# Patient Record
Sex: FEMALE | NOT HISPANIC OR LATINO
[De-identification: names, ages, dates, MRNs, and addresses within clinical notes are randomized per-mention and may not be internally consistent; named-entity substitution may affect disease eponyms.]

---

## 2017-02-26 ENCOUNTER — RESULT REVIEW (OUTPATIENT)
Age: 77
End: 2017-02-26

## 2017-02-27 ENCOUNTER — APPOINTMENT (OUTPATIENT)
Dept: GASTROENTEROLOGY | Facility: CLINIC | Age: 77
End: 2017-02-27

## 2017-03-17 ENCOUNTER — APPOINTMENT (OUTPATIENT)
Dept: GASTROENTEROLOGY | Facility: CLINIC | Age: 77
End: 2017-03-17

## 2017-03-21 ENCOUNTER — APPOINTMENT (OUTPATIENT)
Dept: GASTROENTEROLOGY | Facility: CLINIC | Age: 77
End: 2017-03-21

## 2018-02-26 ENCOUNTER — RX RENEWAL (OUTPATIENT)
Age: 78
End: 2018-02-26

## 2018-02-26 DIAGNOSIS — K20.9 ESOPHAGITIS, UNSPECIFIED: ICD-10-CM

## 2018-02-26 RX ORDER — ESOMEPRAZOLE MAGNESIUM 40 MG/1
40 CAPSULE, DELAYED RELEASE ORAL
Qty: 90 | Refills: 0 | Status: ACTIVE | COMMUNITY
Start: 2018-02-26 | End: 1900-01-01

## 2018-07-05 ENCOUNTER — RESULT REVIEW (OUTPATIENT)
Age: 78
End: 2018-07-05

## 2022-11-03 ENCOUNTER — APPOINTMENT (OUTPATIENT)
Dept: ORTHOPEDIC SURGERY | Facility: CLINIC | Age: 82
End: 2022-11-03

## 2022-11-03 DIAGNOSIS — M54.6 PAIN IN THORACIC SPINE: ICD-10-CM

## 2022-11-03 PROCEDURE — 99204 OFFICE O/P NEW MOD 45 MIN: CPT

## 2022-11-03 RX ORDER — MELOXICAM 7.5 MG/1
7.5 TABLET ORAL TWICE DAILY
Qty: 60 | Refills: 0 | Status: ACTIVE | COMMUNITY
Start: 2022-11-03 | End: 1900-01-01

## 2022-11-07 ENCOUNTER — APPOINTMENT (OUTPATIENT)
Dept: MRI IMAGING | Facility: CLINIC | Age: 82
End: 2022-11-07

## 2022-11-07 PROCEDURE — 72146 MRI CHEST SPINE W/O DYE: CPT

## 2022-11-07 PROCEDURE — 72148 MRI LUMBAR SPINE W/O DYE: CPT

## 2022-11-08 RX ORDER — MELOXICAM 7.5 MG/1
7.5 TABLET ORAL
Qty: 60 | Refills: 0 | Status: ACTIVE | COMMUNITY
Start: 2022-11-08 | End: 1900-01-01

## 2022-11-09 RX ORDER — MELOXICAM 15 MG/1
15 TABLET ORAL
Qty: 30 | Refills: 2 | Status: ACTIVE | COMMUNITY
Start: 2022-11-09 | End: 1900-01-01

## 2022-11-10 ENCOUNTER — APPOINTMENT (OUTPATIENT)
Dept: ORTHOPEDIC SURGERY | Facility: CLINIC | Age: 82
End: 2022-11-10

## 2022-11-10 VITALS — BODY MASS INDEX: 20.49 KG/M2 | WEIGHT: 120 LBS | HEIGHT: 64 IN

## 2022-11-10 PROCEDURE — 99214 OFFICE O/P EST MOD 30 MIN: CPT

## 2022-11-10 RX ORDER — CELECOXIB 100 MG/1
100 CAPSULE ORAL
Qty: 60 | Refills: 0 | Status: ACTIVE | COMMUNITY
Start: 2022-11-10 | End: 1900-01-01

## 2022-11-10 NOTE — ADDENDUM
[FreeTextEntry1] : This note was written by Tammy Mitchell on 11/10/2022 acting as scribe for Dr. Ang Samson M.D.  \par \par I, Ang Samson MD, have read and attest that all the information, medical decision making and discharge instructions within are true and accurate.\par

## 2022-11-10 NOTE — PHYSICAL EXAM
[Stooped] : stooped [Coats's Sign] : negative Coats's sign [Pronator Drift] : negative pronator drift [SLR] : negative straight leg raise [de-identified] : 5 out of 5 motor strength, sensation is intact and symmetrical full range of motion flexion extension and rotation, no palpatory tenderness full range of motion of hips knees shoulders and elbows (all four extremities), no atrophy, negative straight leg raise, no pathological reflexes, no swelling, normal ambulation, no apparent distress skin is intact, no palpable lymph nodes, no upper or lower extremity instability, alert and oriented x3 and normal mood. Normal finger-to nose test. \par Mild percussion tenderness lower thoracic upper lumbar. [de-identified] : I reviewed, interpreted and clinically correlated the following outside imaging studies,\par \par EXAM: [id number] - MR SPINE LUMBAR  - ORDERED BY: MANDY WATKINS\par \par EXAM: [id number] - MR SPINE THORACIC  - ORDERED BY: MANDY WATKINS\par \par \par PROCEDURE DATE:  11/07/2022\par \par \par \par INTERPRETATION:  CLINICAL INDICATION: Thoracic and lumbar spine pain. Concern for compression fracture. Low back muscle strain.\par \par Multiplanar Multisequence MR of the thoracic and lumbar spine.\par \par Prior Studies: None.\par \par FINDINGS:\par \par THORACIC SPINE:\par \par ALIGNMENT: There is slight levoscoliosis of the thoracic spine. The thoracic kyphosis is maintained. There is no spondylolisthesis.\par \par VERTEBRAL BODIES AND MARROW: There is an acute mild compression fracture of the T12 vertebral body with the fracture line subjacent to the superior endplate. There is mild to moderate loss of height. There is no osseous retropulsion.\par \par DISC SPACES: There is multilevel mild disc height loss, most prominent at T5/T6 and T6/T7.\par \par THORACIC CORD: Thoracic cord is normal in caliber and signal characteristics.\par \par IMAGED THORACIC SOFT TISSUES: Intact.\par \par The findings at the individual levels are as follows:\par \par There is no spinal canal or neural foraminal narrowing. There is a minimal disc bulge with slight posterior osseous ridging at T11/T12.\par \par LUMBAR SPINE:\par \par ALIGNMENT: There is slight rightward curvature of the lower lumbar spine. There is trace left lateral listhesis of L2 on L3. Lumbar lordosis is maintained. There is mild degenerative grade 1 anterolisthesis of L4 on L5.\par \par VERTEBRAL BODIES AND MARROW: There is a large chronic Schmorl's node along the inferior endplate of L2. Mild edematous end plate changes are seen at L2/L3 and L3/L4.\par \par DISC SPACES: There is multilevel disc desiccation. There is mild disc height loss at L2/L3.\par \par SACROILIAC JOINTS: There is mild bilateral sacroiliac joint arthrosis.\par \par CONUS AND CAUDA EQUINA: Conus is normal in morphology terminating at the level of L1.\par \par IMAGED ABDOMINAL AND PELVIC STRUCTURES: There is a left-sided renal cyst measuring up to 1.7 cm. There is colonic diverticulosis.\par \par The findings at the individual levels are as follows:\par \par L1/L2: There is minimal disc bulge with posterior osseous ridging. There is bilateral facet arthrosis. There is mild bilateral neural foraminal narrowing. There is no central canal narrowing.\par \par L2/L3: There is mild diffuse disc bulge with a superimposed right foraminal disc protrusion. There is bilateral facet arthrosis and ligamentum flavum hypertrophy. Findings result in mild spinal canal narrowing and mild bilateral neural foraminal narrowing, greater on the right.\par \par L3/4: There is mild diffuse disc bulge posterior osseous ridging. There is bilateral facet arthrosis and ligamentum flavum hypertrophy. There is mild bilateral neural foraminal narrowing. There is mild to moderate spinal canal narrowing.\par \par L4/5: There is uncovering of the posterior disc with a mild diffuse disc bulge. There is bilateral facet arthrosis and ligamentum flavum hypertrophy. Findings result in mild spinal canal narrowing and bilateral neural foraminal narrowing, mild to moderate on the right and mild on the left.\par \par L5/S1: There is bilateral facet arthrosis. There is a small right foraminal disc protrusion. There is mild right neural foraminal narrowing with mild contacting along the undersurface of the exiting right L5 nerve root. There is no central canal or left-sided neural foraminal narrowing.\par \par IMPRESSION:\par \par THORACIC SPINE:\par \par Mild acute compression fracture of the T12 vertebral body along the superior endplate. No significant osseous retropulsion. No spinal canal or neural foraminal narrowing. Follow-up imaging is recommended to confirm reconstitution of fatty marrow signal and exclude any underlying pathologic etiology.\par \par LUMBAR SPINE:\par \par Multilevel lumbar spondylosis. Slight rightward curvature lower lumbar spine. Trace left lateral listhesis of L2 on L3. Mild degenerative grade 1 anterolisthesis of L4 on L5.\par \par L1/L2: There is mild bilateral neural foraminal narrowing.\par \par L2/L3: There is mild spinal canal narrowing and mild bilateral neural foraminal narrowing, greater on the right.\par \par L3/4: There is mild bilateral neural foraminal narrowing. There is mild to moderate spinal canal narrowing.\par \par L4/5: There is mild spinal canal narrowing and bilateral neural foraminal narrowing, mild to moderate on the right and mild on the left.\par \par L5/S1: There is mild right neural foraminal narrowing with mild contacting along the undersurface of the exiting right L5 nerve root.\par \par S1/S2: There is no spinal canal or neural foraminal narrowing. 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 Nov 7 2022  1:13PM\par \par  \par \par XR 2V Right Hip 10/28/2022 - Status post hemiarthroplasty with no periprosthetic fracture- reviewed with patient and friend.	\par \par XR AP LAT Lumbar 10/28/2022 - No acute fracture. Grade 1 spondylolisthesis of L4 on L5. Moderate degenerative spondylosis at L2-L3. Old compression fracture L2. - reviewed with patient and friend.		\par \par XR AP Lat Thoracic 10/28/2022 - No acute fracture. Old mild compression fractures T4-T8.- reviewed with patient and friend. 		\par \par 	\par

## 2022-11-10 NOTE — DISCUSSION/SUMMARY
[de-identified] : T12 compression fracture, 2 weeks old. \par All options discussed. \par Continue brace wear as directed for the next 3-4 weeks. \par Celebrex as needed. \par F/u 4 weeks with new xrays.\par If no better, kyphoplasty. \par All options discussed including rest, medicine, home exercise, acupuncture, Chiropractic care, Physical Therapy, Pain management, and last resort surgery. All questions were answered, all alternatives discussed and the patient is in complete agreement with that plan. Follow-up appointment as instructed. Any issues and the patient will call or come in sooner. \par Her friend agrees with the plan.

## 2022-11-10 NOTE — HISTORY OF PRESENT ILLNESS
[Stable] : stable [de-identified] : Ms. SHAUNA TOLEDO  is a 82 year old female who presents to the office for a follow-up visit. \par Here to review MRI results.\par She states that she is unable to wear the back brace. \par She is taking NSAIDs for pain as needed. \par Pt has taken old Mobic for this. \par Below from prior note on 11/3/22\par 82 year female presents for a follow up evaluation of lower back pain since lifting a window on 10/26/22. \par She had went to [facility] ED and had thoracic and lumbar XRs obtained which revealed old mild compression fractures of T4 through T8, spondylolisthesis of L4/5, moderate spondylosis at L2/3 and old compression fracture of L2. \par Since the injury, the pain has worsened.\par She has left sided thoracic and lumbar pain. \par Denies radiation of pain down the legs.\par Has numbness of the right foot since the injury. \par Uses tincture of arnica (homeopathic remedy) which alleviates the pain. \par No recent PT or chiropractic care. \par No fever chills sweats nausea vomiting no bowel or bladder dysfunction, no recent weight loss or gain no night pain. This history is in addition to the intake form that I personally reviewed.

## 2022-12-08 ENCOUNTER — APPOINTMENT (OUTPATIENT)
Dept: ORTHOPEDIC SURGERY | Facility: CLINIC | Age: 82
End: 2022-12-08

## 2022-12-08 VITALS
BODY MASS INDEX: 20.49 KG/M2 | OXYGEN SATURATION: 96 % | HEIGHT: 64 IN | DIASTOLIC BLOOD PRESSURE: 81 MMHG | HEART RATE: 77 BPM | WEIGHT: 120 LBS | TEMPERATURE: 97.4 F | SYSTOLIC BLOOD PRESSURE: 132 MMHG

## 2022-12-08 PROCEDURE — 72100 X-RAY EXAM L-S SPINE 2/3 VWS: CPT

## 2022-12-08 PROCEDURE — 99214 OFFICE O/P EST MOD 30 MIN: CPT

## 2023-01-09 ENCOUNTER — APPOINTMENT (OUTPATIENT)
Dept: ORTHOPEDIC SURGERY | Facility: CLINIC | Age: 83
End: 2023-01-09
Payer: MEDICARE

## 2023-01-09 VITALS
HEIGHT: 64 IN | DIASTOLIC BLOOD PRESSURE: 65 MMHG | WEIGHT: 115 LBS | TEMPERATURE: 98 F | SYSTOLIC BLOOD PRESSURE: 119 MMHG | OXYGEN SATURATION: 98 % | HEART RATE: 69 BPM | BODY MASS INDEX: 19.63 KG/M2

## 2023-01-09 PROCEDURE — 99214 OFFICE O/P EST MOD 30 MIN: CPT

## 2023-01-09 PROCEDURE — 72100 X-RAY EXAM L-S SPINE 2/3 VWS: CPT

## 2023-02-23 ENCOUNTER — APPOINTMENT (OUTPATIENT)
Dept: ORTHOPEDIC SURGERY | Facility: CLINIC | Age: 83
End: 2023-02-23
Payer: MEDICARE

## 2023-02-23 VITALS
SYSTOLIC BLOOD PRESSURE: 157 MMHG | DIASTOLIC BLOOD PRESSURE: 60 MMHG | HEART RATE: 67 BPM | HEIGHT: 64 IN | BODY MASS INDEX: 19.63 KG/M2 | WEIGHT: 115 LBS

## 2023-02-23 PROCEDURE — 99214 OFFICE O/P EST MOD 30 MIN: CPT

## 2023-02-23 PROCEDURE — 72100 X-RAY EXAM L-S SPINE 2/3 VWS: CPT

## 2023-02-23 NOTE — PHYSICAL EXAM
[Normal] : Gait: normal [Stooped] : stooped [Coats's Sign] : negative Coats's sign [Pronator Drift] : negative pronator drift [SLR] : negative straight leg raise [de-identified] : 5 out of 5 motor strength, sensation is intact and symmetrical full range of motion flexion extension and rotation, no palpatory tenderness full range of motion of hips knees shoulders and elbows (all four extremities), no atrophy, negative straight leg raise, no pathological reflexes, no swelling, normal ambulation, no apparent distress skin is intact, no palpable lymph nodes, no upper or lower extremity instability, alert and oriented x3 and normal mood. Normal finger-to nose test. \par No percussion tenderness lower thoracic upper lumbar. [de-identified] : XR AP Lat Lumbar 02/23/2023 -T12/L2 compression fracture, healing- reviewed with patient.\par \par XR AP Lat Thoracic 02/23/2023 -T12 compression fracture, healing- reviewed with patient.\par \par XR AP Lat Lumbar 01/09/2023 -T12 fracture -reviewed with patient. \par \par XR AP Lat Lumbar 12/08/2022-healing T12 fracture, old L2-reviewed with patient. \par \par \par I reviewed, interpreted and clinically correlated the following outside imaging studies,\par \par EXAM: [id number] - MR SPINE LUMBAR  - ORDERED BY: MANDY WATKINS\par \par EXAM: [id number] - MR SPINE THORACIC  - ORDERED BY: MANDY WATKINS\par \par \par PROCEDURE DATE:  11/07/2022\par \par \par \par INTERPRETATION:  CLINICAL INDICATION: Thoracic and lumbar spine pain. Concern for compression fracture. Low back muscle strain.\par \par Multiplanar Multisequence MR of the thoracic and lumbar spine.\par \par Prior Studies: None.\par \par FINDINGS:\par \par THORACIC SPINE:\par \par ALIGNMENT: There is slight levoscoliosis of the thoracic spine. The thoracic kyphosis is maintained. There is no spondylolisthesis.\par \par VERTEBRAL BODIES AND MARROW: There is an acute mild compression fracture of the T12 vertebral body with the fracture line subjacent to the superior endplate. There is mild to moderate loss of height. There is no osseous retropulsion.\par \par DISC SPACES: There is multilevel mild disc height loss, most prominent at T5/T6 and T6/T7.\par \par THORACIC CORD: Thoracic cord is normal in caliber and signal characteristics.\par \par IMAGED THORACIC SOFT TISSUES: Intact.\par \par The findings at the individual levels are as follows:\par \par There is no spinal canal or neural foraminal narrowing. There is a minimal disc bulge with slight posterior osseous ridging at T11/T12.\par \par LUMBAR SPINE:\par \par ALIGNMENT: There is slight rightward curvature of the lower lumbar spine. There is trace left lateral listhesis of L2 on L3. Lumbar lordosis is maintained. There is mild degenerative grade 1 anterolisthesis of L4 on L5.\par \par VERTEBRAL BODIES AND MARROW: There is a large chronic Schmorl's node along the inferior endplate of L2. Mild edematous end plate changes are seen at L2/L3 and L3/L4.\par \par DISC SPACES: There is multilevel disc desiccation. There is mild disc height loss at L2/L3.\par \par SACROILIAC JOINTS: There is mild bilateral sacroiliac joint arthrosis.\par \par CONUS AND CAUDA EQUINA: Conus is normal in morphology terminating at the level of L1.\par \par IMAGED ABDOMINAL AND PELVIC STRUCTURES: There is a left-sided renal cyst measuring up to 1.7 cm. There is colonic diverticulosis.\par \par The findings at the individual levels are as follows:\par \par L1/L2: There is minimal disc bulge with posterior osseous ridging. There is bilateral facet arthrosis. There is mild bilateral neural foraminal narrowing. There is no central canal narrowing.\par \par L2/L3: There is mild diffuse disc bulge with a superimposed right foraminal disc protrusion. There is bilateral facet arthrosis and ligamentum flavum hypertrophy. Findings result in mild spinal canal narrowing and mild bilateral neural foraminal narrowing, greater on the right.\par \par L3/4: There is mild diffuse disc bulge posterior osseous ridging. There is bilateral facet arthrosis and ligamentum flavum hypertrophy. There is mild bilateral neural foraminal narrowing. There is mild to moderate spinal canal narrowing.\par \par L4/5: There is uncovering of the posterior disc with a mild diffuse disc bulge. There is bilateral facet arthrosis and ligamentum flavum hypertrophy. Findings result in mild spinal canal narrowing and bilateral neural foraminal narrowing, mild to moderate on the right and mild on the left.\par \par L5/S1: There is bilateral facet arthrosis. There is a small right foraminal disc protrusion. There is mild right neural foraminal narrowing with mild contacting along the undersurface of the exiting right L5 nerve root. There is no central canal or left-sided neural foraminal narrowing.\par \par IMPRESSION:\par \par THORACIC SPINE:\par \par Mild acute compression fracture of the T12 vertebral body along the superior endplate. No significant osseous retropulsion. No spinal canal or neural foraminal narrowing. Follow-up imaging is recommended to confirm reconstitution of fatty marrow signal and exclude any underlying pathologic etiology.\par \par LUMBAR SPINE:\par \par Multilevel lumbar spondylosis. Slight rightward curvature lower lumbar spine. Trace left lateral listhesis of L2 on L3. Mild degenerative grade 1 anterolisthesis of L4 on L5.\par \par L1/L2: There is mild bilateral neural foraminal narrowing.\par \par L2/L3: There is mild spinal canal narrowing and mild bilateral neural foraminal narrowing, greater on the right.\par \par L3/4: There is mild bilateral neural foraminal narrowing. There is mild to moderate spinal canal narrowing.\par \par L4/5: There is mild spinal canal narrowing and bilateral neural foraminal narrowing, mild to moderate on the right and mild on the left.\par \par L5/S1: There is mild right neural foraminal narrowing with mild contacting along the undersurface of the exiting right L5 nerve root.\par \par S1/S2: There is no spinal canal or neural foraminal narrowing. 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 Nov 7 2022  1:13PM\par \par  \par \par XR 2V Right Hip 10/28/2022 - Status post hemiarthroplasty with no periprosthetic fracture- reviewed with patient and friend.	\par \par XR AP LAT Lumbar 10/28/2022 - No acute fracture. Grade 1 spondylolisthesis of L4 on L5. Moderate degenerative spondylosis at L2-L3. Old compression fracture L2. - reviewed with patient and friend.		\par \par XR AP Lat Thoracic 10/28/2022 - No acute fracture. Old mild compression fractures T4-T8.- reviewed with patient and friend. 		\par \par 	\par

## 2023-02-23 NOTE — ADDENDUM
[FreeTextEntry1] : This note was written by Luis Velarde on 02/23/2023 acting as scribe for Dr. Ang Samson M.D.\par \par I, Ang Samson MD, have read and attest that all the information, medical decision making and discharge instructions within are true and accurate.

## 2023-02-23 NOTE — DISCUSSION/SUMMARY
[de-identified] : T12/L2 compression fracture, almost 11 weeks old.\par Feeling better.\par Reviewed the do's and don'ts.\par All options discussed. \par Referral for physical therapy. \par F/U PRN.\par All options discussed including rest, medicine, home exercise, acupuncture, Chiropractic care, Physical Therapy, Pain management, and last resort surgery. All questions were answered, all alternatives discussed and the patient is in complete agreement with that plan. Follow-up appointment as instructed. Any issues and the patient will call or come in sooner. \par Her friend agrees with the plan.

## 2023-02-23 NOTE — HISTORY OF PRESENT ILLNESS
[Improving] : improving [de-identified] : Ms. SHAUNA TOLEDO  is a 82 year old female who presents to the office for a follow-up of T12 fracture. \par Had lower back pain after lifting a window on 10/26/22. \par Feeling better since last visit. \par No fever chills sweats nausea vomiting no bowel or bladder dysfunction, no recent weight loss or gain no night pain. This history is in addition to the intake form that I personally reviewed. \par Overall pain has improved. \par \par

## 2023-05-17 ENCOUNTER — APPOINTMENT (OUTPATIENT)
Dept: ORTHOPEDIC SURGERY | Facility: CLINIC | Age: 83
End: 2023-05-17
Payer: MEDICARE

## 2023-05-17 VITALS
DIASTOLIC BLOOD PRESSURE: 72 MMHG | HEART RATE: 61 BPM | HEIGHT: 64 IN | BODY MASS INDEX: 19.46 KG/M2 | WEIGHT: 114 LBS | OXYGEN SATURATION: 97 % | SYSTOLIC BLOOD PRESSURE: 152 MMHG

## 2023-05-17 PROCEDURE — 99214 OFFICE O/P EST MOD 30 MIN: CPT

## 2023-05-17 PROCEDURE — 72070 X-RAY EXAM THORAC SPINE 2VWS: CPT

## 2023-05-23 ENCOUNTER — APPOINTMENT (OUTPATIENT)
Dept: MRI IMAGING | Facility: CLINIC | Age: 83
End: 2023-05-23
Payer: MEDICARE

## 2023-05-23 PROCEDURE — 72148 MRI LUMBAR SPINE W/O DYE: CPT

## 2023-05-23 PROCEDURE — 72146 MRI CHEST SPINE W/O DYE: CPT

## 2023-05-26 ENCOUNTER — APPOINTMENT (OUTPATIENT)
Dept: MRI IMAGING | Facility: CLINIC | Age: 83
End: 2023-05-26

## 2023-05-31 ENCOUNTER — APPOINTMENT (OUTPATIENT)
Dept: ORTHOPEDIC SURGERY | Facility: CLINIC | Age: 83
End: 2023-05-31
Payer: MEDICARE

## 2023-05-31 VITALS
HEART RATE: 78 BPM | BODY MASS INDEX: 19.46 KG/M2 | WEIGHT: 114 LBS | SYSTOLIC BLOOD PRESSURE: 136 MMHG | HEIGHT: 64 IN | DIASTOLIC BLOOD PRESSURE: 67 MMHG | OXYGEN SATURATION: 98 %

## 2023-05-31 PROCEDURE — 99214 OFFICE O/P EST MOD 30 MIN: CPT

## 2023-08-14 ENCOUNTER — APPOINTMENT (OUTPATIENT)
Dept: ORTHOPEDIC SURGERY | Facility: CLINIC | Age: 83
End: 2023-08-14
Payer: MEDICARE

## 2023-08-14 VITALS
SYSTOLIC BLOOD PRESSURE: 150 MMHG | DIASTOLIC BLOOD PRESSURE: 61 MMHG | WEIGHT: 110 LBS | HEIGHT: 64 IN | HEART RATE: 59 BPM | OXYGEN SATURATION: 99 % | BODY MASS INDEX: 18.78 KG/M2

## 2023-08-14 PROCEDURE — 99214 OFFICE O/P EST MOD 30 MIN: CPT

## 2023-08-14 NOTE — DISCUSSION/SUMMARY
[de-identified] : T12/L2 compression fracture. T12 compression fracture.  Lumbar degenerative disc disease Left SI joint pain. Discussed all options.  Pelvic MRI referral. F/U after MRI. All options discussed including rest, medicine, home exercise, acupuncture, Chiropractic care, Physical Therapy, Pain management, and last resort surgery. All questions were answered, all alternatives discussed, and the patient is in complete agreement with the treatment plan which the patient contributed to and discussed with me through the shared decision-making process. Follow-up appointment as instructed. Any issues and the patient will call or come in sooner.  Her friend agrees with the plan.

## 2023-08-14 NOTE — PHYSICAL EXAM
[Normal] : Gait: normal [Stooped] : stooped [Coats's Sign] : negative Coats's sign [Pronator Drift] : negative pronator drift [SLR] : negative straight leg raise [de-identified] : 5 out of 5 motor strength, sensation is intact and symmetrical full range of motion flexion extension and rotation, no palpatory tenderness full range of motion of hips knees shoulders and elbows (all four extremities), no atrophy, negative straight leg raise, no pathological reflexes, no swelling, normal ambulation, no apparent distress skin is intact, no palpable lymph nodes, no upper or lower extremity instability, alert and oriented x3 and normal mood. Normal finger-to nose test.  No percussion tenderness lower thoracic upper lumbar. Left SI joint pain. [de-identified] : I reviewed, interpreted and clinically correlated the following outside imaging studies,   MR SPINE THORACIC - ORDERED BY: MANDY WATKINS  EXAM: [id number] - MR SPINE LUMBAR - ORDERED BY: MANDY WATKINS   PROCEDURE DATE: 05/23/2023    INTERPRETATION: MR LUMBAR SPINE, MR THORACIC SPINE  CLINICAL INFORMATION: Thoracic and lumbar spine pain. Evaluate for compression fractures T12 compression fracture.  ADDITIONAL CLINICAL INFORMATION: Other Condition see Clinical Info  TECHNIQUE: Multiplanar, multisequence MRI was performed of the thoracic and lumbar spine. IV Contrast: NONE  COMPARISON: Thoracic and lumbar spine MRIs dated 11/7/2022. CT of the neck dated 1/25/2012.  FINDINGS:  LOCALIZER: There may be a healed fracture deformity of the odontoid base. Cervical degenerative disc disease is present with reversal of the normal lordosis and mild to moderate spinal canal stenosis suggested at C4-C5 and at least moderate spinal canal stenosis suggested at C5-C6. Bilateral hip arthroplasties are noted.  BONES: Moderate to severe T12 wedge compression deformity is increased from the prior with mild marrow edema suggesting more recent injury. Mild L2 inferior endplate compression deformity/broad Schmorl's node appears stable. There are slight Modic type I endplate degenerative changes noted at T11-T12, L2-L3, and L3-L4.  ALIGNMENT: Mild anterolisthesis is present at L4-L5.  BILATERAL SHOULDER JOINTS: Severe bilateral glenohumeral osteoarthritis is present and there is mild bilateral acromioclavicular joint arthrosis.  VISUALIZED SACROILIAC JOINTS: Maintained.  SPINAL CORD AND CAUDA EQUINA: Spinal cord is normal in signal. Conus terminates at L1-L2.  VISUALIZED THORACIC AND ABDOMINAL SOFT TISSUES: Presumed left renal cyst is again seen.  IMAGED LUNGS: Although limited on MRI of the visualized lungs demonstrate suspected scarring within the posterior right upper lobe versus other consolidation.  INDIVIDUAL LEVELS:  Thoracic T1-T2: Central disc protrusion is present without stenosis.  T11-T12: Mild T12 superior endplate retropulsion is present without significant stenosis.  T12-L1: Mild posterior disc bulging is present without stenosis.  Lumbar L1-L2: Disc bulging is present without significant stenosis.  L2-L3: Disc bulging is present lateralizing to the right with ligamentum flavum hypertrophy and moderate spinal canal stenosis. Moderate right and mild left neural foraminal stenosis is present.  L3-L4: Disc bulging is present with ligamentum flavum hypertrophy and mild to moderate spinal canal stenosis. Mild bilateral neural foraminal stenosis is present. There is mild left greater than right facet arthrosis.  L4-L5: Mild anterolisthesis is present with mild loss of disc height, mild disc bulging lateralizing to the right, mild ligament flavum hypertrophy, and mild-to-moderate spinal canal stenosis. Moderate right and mild left neural foraminal stenosis is present. There is mild-to-moderate left greater than right facet arthrosis.  L5-S1: Mild disc bulging is present laterally is a right without spinal canal stenosis. Mild to moderate right neural foraminal stenosis is present.  IMPRESSION: 1. Moderate to severe T12 wedge compression deformity is increased from the prior with mild edema suggesting more recent injury. 2. Mild T12 inferior endplate compression deformity/broad Schmorl's node appears stable. 3. Multilevel degenerative disc disease appears similar to the prior including cervical degenerative disc disease that is partially imaged on the localizer series. Consider dedicated MRI of the cervical spine as warranted. 4. There is no significant thoracic stenosis point 5. L2-L3 demonstrates moderate spinal canal stenosis that may be mildly increased from the prior. 6. L3-L4 and L4-L5 demonstrate mild to moderate spinal canal stenosis. 7. There is variable lumbar neural foraminal stenosis.  --- End of Report ---       XR AP Lat Lumbar 05/17/2023 - T12/L2 compression fracture, healing / Lumbar degenerative disc disease  -reviewed with patient.    XR AP Lat Lumbar 02/23/2023 -T12/L2 compression fracture, healing- reviewed with patient.  XR AP Lat Thoracic 02/23/2023 -T12 compression fracture, healing- reviewed with patient.  XR AP Lat Lumbar 01/09/2023 -T12 fracture -reviewed with patient.   XR AP Lat Lumbar 12/08/2022-healing T12 fracture, old L2-reviewed with patient.    I reviewed, interpreted and clinically correlated the following outside imaging studies,  EXAM: [id number] - MR SPINE LUMBAR  - ORDERED BY: MANDY WATKINS  EXAM: [id number] - MR SPINE THORACIC  - ORDERED BY: MANDY WATKINS   PROCEDURE DATE:  11/07/2022    INTERPRETATION:  CLINICAL INDICATION: Thoracic and lumbar spine pain. Concern for compression fracture. Low back muscle strain.  Multiplanar Multisequence MR of the thoracic and lumbar spine.  Prior Studies: None.  FINDINGS:  THORACIC SPINE:  ALIGNMENT: There is slight levoscoliosis of the thoracic spine. The thoracic kyphosis is maintained. There is no spondylolisthesis.  VERTEBRAL BODIES AND MARROW: There is an acute mild compression fracture of the T12 vertebral body with the fracture line subjacent to the superior endplate. There is mild to moderate loss of height. There is no osseous retropulsion.  DISC SPACES: There is multilevel mild disc height loss, most prominent at T5/T6 and T6/T7.  THORACIC CORD: Thoracic cord is normal in caliber and signal characteristics.  IMAGED THORACIC SOFT TISSUES: Intact.  The findings at the individual levels are as follows:  There is no spinal canal or neural foraminal narrowing. There is a minimal disc bulge with slight posterior osseous ridging at T11/T12.  LUMBAR SPINE:  ALIGNMENT: There is slight rightward curvature of the lower lumbar spine. There is trace left lateral listhesis of L2 on L3. Lumbar lordosis is maintained. There is mild degenerative grade 1 anterolisthesis of L4 on L5.  VERTEBRAL BODIES AND MARROW: There is a large chronic Schmorl's node along the inferior endplate of L2. Mild edematous end plate changes are seen at L2/L3 and L3/L4.  DISC SPACES: There is multilevel disc desiccation. There is mild disc height loss at L2/L3.  SACROILIAC JOINTS: There is mild bilateral sacroiliac joint arthrosis.  CONUS AND CAUDA EQUINA: Conus is normal in morphology terminating at the level of L1.  IMAGED ABDOMINAL AND PELVIC STRUCTURES: There is a left-sided renal cyst measuring up to 1.7 cm. There is colonic diverticulosis.  The findings at the individual levels are as follows:  L1/L2: There is minimal disc bulge with posterior osseous ridging. There is bilateral facet arthrosis. There is mild bilateral neural foraminal narrowing. There is no central canal narrowing.  L2/L3: There is mild diffuse disc bulge with a superimposed right foraminal disc protrusion. There is bilateral facet arthrosis and ligamentum flavum hypertrophy. Findings result in mild spinal canal narrowing and mild bilateral neural foraminal narrowing, greater on the right.  L3/4: There is mild diffuse disc bulge posterior osseous ridging. There is bilateral facet arthrosis and ligamentum flavum hypertrophy. There is mild bilateral neural foraminal narrowing. There is mild to moderate spinal canal narrowing.  L4/5: There is uncovering of the posterior disc with a mild diffuse disc bulge. There is bilateral facet arthrosis and ligamentum flavum hypertrophy. Findings result in mild spinal canal narrowing and bilateral neural foraminal narrowing, mild to moderate on the right and mild on the left.  L5/S1: There is bilateral facet arthrosis. There is a small right foraminal disc protrusion. There is mild right neural foraminal narrowing with mild contacting along the undersurface of the exiting right L5 nerve root. There is no central canal or left-sided neural foraminal narrowing.  IMPRESSION:  THORACIC SPINE:  Mild acute compression fracture of the T12 vertebral body along the superior endplate. No significant osseous retropulsion. No spinal canal or neural foraminal narrowing. Follow-up imaging is recommended to confirm reconstitution of fatty marrow signal and exclude any underlying pathologic etiology.  LUMBAR SPINE:  Multilevel lumbar spondylosis. Slight rightward curvature lower lumbar spine. Trace left lateral listhesis of L2 on L3. Mild degenerative grade 1 anterolisthesis of L4 on L5.  L1/L2: There is mild bilateral neural foraminal narrowing.  L2/L3: There is mild spinal canal narrowing and mild bilateral neural foraminal narrowing, greater on the right.  L3/4: There is mild bilateral neural foraminal narrowing. There is mild to moderate spinal canal narrowing.  L4/5: There is mild spinal canal narrowing and bilateral neural foraminal narrowing, mild to moderate on the right and mild on the left.  L5/S1: There is mild right neural foraminal narrowing with mild contacting along the undersurface of the exiting right L5 nerve root.  S1/S2: There is no spinal canal or neural foraminal narrowing. 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  --- End of Report ---   HORTENCIA MOONEY MD; Attending Radiologist This document has been electronically signed. Nov 7 2022  1:13PM     XR 2V Right Hip 10/28/2022 - Status post hemiarthroplasty with no periprosthetic fracture- reviewed with patient and friend.	  XR AP LAT Lumbar 10/28/2022 - No acute fracture. Grade 1 spondylolisthesis of L4 on L5. Moderate degenerative spondylosis at L2-L3. Old compression fracture L2. - reviewed with patient and friend.		  XR AP Lat Thoracic 10/28/2022 - No acute fracture. Old mild compression fractures T4-T8.- reviewed with patient and friend.

## 2023-08-14 NOTE — ADDENDUM
[FreeTextEntry1] : This note was written by Luis Velarde on 08/14/2023 acting as scribe for Dr. Ang Samson M.D.  I, Ang Samson MD, have read and attest that all the information, medical decision making and discharge instructions within are true and accurate.

## 2023-08-21 ENCOUNTER — APPOINTMENT (OUTPATIENT)
Dept: MRI IMAGING | Facility: CLINIC | Age: 83
End: 2023-08-21

## 2023-08-24 ENCOUNTER — APPOINTMENT (OUTPATIENT)
Dept: ORTHOPEDIC SURGERY | Facility: CLINIC | Age: 83
End: 2023-08-24
Payer: MEDICARE

## 2023-08-24 VITALS
BODY MASS INDEX: 18.78 KG/M2 | OXYGEN SATURATION: 98 % | WEIGHT: 110 LBS | HEIGHT: 64 IN | HEART RATE: 55 BPM | SYSTOLIC BLOOD PRESSURE: 135 MMHG | DIASTOLIC BLOOD PRESSURE: 71 MMHG

## 2023-08-24 PROCEDURE — 99214 OFFICE O/P EST MOD 30 MIN: CPT

## 2023-08-24 NOTE — ADDENDUM
[FreeTextEntry1] : This note was written by Luis Velarde on 08/24/2023 acting as scribe for Dr. Ang Samson M.D.  I, Ang Samson MD, have read and attest that all the information, medical decision making and discharge instructions within are true and accurate.

## 2023-08-24 NOTE — HISTORY OF PRESENT ILLNESS
[Improving] : improving [de-identified] : Ms. SHAUNA TOLEDO  is a 82 year old female who presents for evaluation of left sided thoracic pain and lumbar spine pain x several months.  Has T12 and L2 fractures.  She states that her pain has worsened.  Denies radiation of pain down the arms and legs. Has some numbness of the RLE.  She states certain movements and bending aggravates the pain.  Has tried NSAIDs and has relief. Left sided pelvic pain. Was referred to PT at last visit but states that the PT has worsened her pain. She participated with PT twice a week since the last visit. She stopped the PT about 3 weeks ago. She currently has pain at the right lower back. She states she had a fall earlier this year.  Presents today for MRI Pelvis review.  No fever chills sweats nausea vomiting no bowel or bladder dysfunction, no recent weight loss or gain no night pain. This history is in addition to the intake form that I personally reviewed.

## 2023-08-24 NOTE — DISCUSSION/SUMMARY
[de-identified] : T12/L2 compression fracture. T12 compression fracture.  Lumbar degenerative disc disease Left trochanteric bursitis  Discussed all options.  Referral for physical therapy.  All options discussed including rest, medicine, home exercise, acupuncture, Chiropractic care, Physical Therapy, Pain management, and last resort surgery. All questions were answered, all alternatives discussed, and the patient is in complete agreement with the treatment plan which the patient contributed to and discussed with me through the shared decision-making process. Follow-up appointment as instructed. Any issues and the patient will call or come in sooner.  Her friend agrees with the plan.

## 2023-08-24 NOTE — PHYSICAL EXAM
[Normal] : Gait: normal [Stooped] : stooped [Coats's Sign] : negative Coats's sign [Pronator Drift] : negative pronator drift [SLR] : negative straight leg raise [de-identified] : 5 out of 5 motor strength, sensation is intact and symmetrical full range of motion flexion extension and rotation, no palpatory tenderness full range of motion of hips knees shoulders and elbows (all four extremities), no atrophy, negative straight leg raise, no pathological reflexes, no swelling, normal ambulation, no apparent distress skin is intact, no palpable lymph nodes, no upper or lower extremity instability, alert and oriented x3 and normal mood. Normal finger-to nose test.  No percussion tenderness lower thoracic upper lumbar. Pain over left troch. [de-identified] : I reviewed, interpreted and clinically correlated the following outside imaging studies,   MRI Pelvis 8/17/23 Iliopsoas Bursitis.   MR SPINE THORACIC - ORDERED BY: MANDY WATKINS  EXAM: [id number] - MR SPINE LUMBAR - ORDERED BY: MANDY WATKINS   PROCEDURE DATE: 05/23/2023    INTERPRETATION: MR LUMBAR SPINE, MR THORACIC SPINE  CLINICAL INFORMATION: Thoracic and lumbar spine pain. Evaluate for compression fractures T12 compression fracture.  ADDITIONAL CLINICAL INFORMATION: Other Condition see Clinical Info  TECHNIQUE: Multiplanar, multisequence MRI was performed of the thoracic and lumbar spine. IV Contrast: NONE  COMPARISON: Thoracic and lumbar spine MRIs dated 11/7/2022. CT of the neck dated 1/25/2012.  FINDINGS:  LOCALIZER: There may be a healed fracture deformity of the odontoid base. Cervical degenerative disc disease is present with reversal of the normal lordosis and mild to moderate spinal canal stenosis suggested at C4-C5 and at least moderate spinal canal stenosis suggested at C5-C6. Bilateral hip arthroplasties are noted.  BONES: Moderate to severe T12 wedge compression deformity is increased from the prior with mild marrow edema suggesting more recent injury. Mild L2 inferior endplate compression deformity/broad Schmorl's node appears stable. There are slight Modic type I endplate degenerative changes noted at T11-T12, L2-L3, and L3-L4.  ALIGNMENT: Mild anterolisthesis is present at L4-L5.  BILATERAL SHOULDER JOINTS: Severe bilateral glenohumeral osteoarthritis is present and there is mild bilateral acromioclavicular joint arthrosis.  VISUALIZED SACROILIAC JOINTS: Maintained.  SPINAL CORD AND CAUDA EQUINA: Spinal cord is normal in signal. Conus terminates at L1-L2.  VISUALIZED THORACIC AND ABDOMINAL SOFT TISSUES: Presumed left renal cyst is again seen.  IMAGED LUNGS: Although limited on MRI of the visualized lungs demonstrate suspected scarring within the posterior right upper lobe versus other consolidation.  INDIVIDUAL LEVELS:  Thoracic T1-T2: Central disc protrusion is present without stenosis.  T11-T12: Mild T12 superior endplate retropulsion is present without significant stenosis.  T12-L1: Mild posterior disc bulging is present without stenosis.  Lumbar L1-L2: Disc bulging is present without significant stenosis.  L2-L3: Disc bulging is present lateralizing to the right with ligamentum flavum hypertrophy and moderate spinal canal stenosis. Moderate right and mild left neural foraminal stenosis is present.  L3-L4: Disc bulging is present with ligamentum flavum hypertrophy and mild to moderate spinal canal stenosis. Mild bilateral neural foraminal stenosis is present. There is mild left greater than right facet arthrosis.  L4-L5: Mild anterolisthesis is present with mild loss of disc height, mild disc bulging lateralizing to the right, mild ligament flavum hypertrophy, and mild-to-moderate spinal canal stenosis. Moderate right and mild left neural foraminal stenosis is present. There is mild-to-moderate left greater than right facet arthrosis.  L5-S1: Mild disc bulging is present laterally is a right without spinal canal stenosis. Mild to moderate right neural foraminal stenosis is present.  IMPRESSION: 1. Moderate to severe T12 wedge compression deformity is increased from the prior with mild edema suggesting more recent injury. 2. Mild T12 inferior endplate compression deformity/broad Schmorl's node appears stable. 3. Multilevel degenerative disc disease appears similar to the prior including cervical degenerative disc disease that is partially imaged on the localizer series. Consider dedicated MRI of the cervical spine as warranted. 4. There is no significant thoracic stenosis point 5. L2-L3 demonstrates moderate spinal canal stenosis that may be mildly increased from the prior. 6. L3-L4 and L4-L5 demonstrate mild to moderate spinal canal stenosis. 7. There is variable lumbar neural foraminal stenosis.  --- End of Report ---       XR AP Lat Lumbar 05/17/2023 - T12/L2 compression fracture, healing / Lumbar degenerative disc disease  -reviewed with patient.    XR AP Lat Lumbar 02/23/2023 -T12/L2 compression fracture, healing- reviewed with patient.  XR AP Lat Thoracic 02/23/2023 -T12 compression fracture, healing- reviewed with patient.  XR AP Lat Lumbar 01/09/2023 -T12 fracture -reviewed with patient.   XR AP Lat Lumbar 12/08/2022-healing T12 fracture, old L2-reviewed with patient.    I reviewed, interpreted and clinically correlated the following outside imaging studies,  EXAM: [id number] - MR SPINE LUMBAR  - ORDERED BY: MANDY WATKINS  EXAM: [id number] - MR SPINE THORACIC  - ORDERED BY: MANDY WATKINS   PROCEDURE DATE:  11/07/2022    INTERPRETATION:  CLINICAL INDICATION: Thoracic and lumbar spine pain. Concern for compression fracture. Low back muscle strain.  Multiplanar Multisequence MR of the thoracic and lumbar spine.  Prior Studies: None.  FINDINGS:  THORACIC SPINE:  ALIGNMENT: There is slight levoscoliosis of the thoracic spine. The thoracic kyphosis is maintained. There is no spondylolisthesis.  VERTEBRAL BODIES AND MARROW: There is an acute mild compression fracture of the T12 vertebral body with the fracture line subjacent to the superior endplate. There is mild to moderate loss of height. There is no osseous retropulsion.  DISC SPACES: There is multilevel mild disc height loss, most prominent at T5/T6 and T6/T7.  THORACIC CORD: Thoracic cord is normal in caliber and signal characteristics.  IMAGED THORACIC SOFT TISSUES: Intact.  The findings at the individual levels are as follows:  There is no spinal canal or neural foraminal narrowing. There is a minimal disc bulge with slight posterior osseous ridging at T11/T12.  LUMBAR SPINE:  ALIGNMENT: There is slight rightward curvature of the lower lumbar spine. There is trace left lateral listhesis of L2 on L3. Lumbar lordosis is maintained. There is mild degenerative grade 1 anterolisthesis of L4 on L5.  VERTEBRAL BODIES AND MARROW: There is a large chronic Schmorl's node along the inferior endplate of L2. Mild edematous end plate changes are seen at L2/L3 and L3/L4.  DISC SPACES: There is multilevel disc desiccation. There is mild disc height loss at L2/L3.  SACROILIAC JOINTS: There is mild bilateral sacroiliac joint arthrosis.  CONUS AND CAUDA EQUINA: Conus is normal in morphology terminating at the level of L1.  IMAGED ABDOMINAL AND PELVIC STRUCTURES: There is a left-sided renal cyst measuring up to 1.7 cm. There is colonic diverticulosis.  The findings at the individual levels are as follows:  L1/L2: There is minimal disc bulge with posterior osseous ridging. There is bilateral facet arthrosis. There is mild bilateral neural foraminal narrowing. There is no central canal narrowing.  L2/L3: There is mild diffuse disc bulge with a superimposed right foraminal disc protrusion. There is bilateral facet arthrosis and ligamentum flavum hypertrophy. Findings result in mild spinal canal narrowing and mild bilateral neural foraminal narrowing, greater on the right.  L3/4: There is mild diffuse disc bulge posterior osseous ridging. There is bilateral facet arthrosis and ligamentum flavum hypertrophy. There is mild bilateral neural foraminal narrowing. There is mild to moderate spinal canal narrowing.  L4/5: There is uncovering of the posterior disc with a mild diffuse disc bulge. There is bilateral facet arthrosis and ligamentum flavum hypertrophy. Findings result in mild spinal canal narrowing and bilateral neural foraminal narrowing, mild to moderate on the right and mild on the left.  L5/S1: There is bilateral facet arthrosis. There is a small right foraminal disc protrusion. There is mild right neural foraminal narrowing with mild contacting along the undersurface of the exiting right L5 nerve root. There is no central canal or left-sided neural foraminal narrowing.  IMPRESSION:  THORACIC SPINE:  Mild acute compression fracture of the T12 vertebral body along the superior endplate. No significant osseous retropulsion. No spinal canal or neural foraminal narrowing. Follow-up imaging is recommended to confirm reconstitution of fatty marrow signal and exclude any underlying pathologic etiology.  LUMBAR SPINE:  Multilevel lumbar spondylosis. Slight rightward curvature lower lumbar spine. Trace left lateral listhesis of L2 on L3. Mild degenerative grade 1 anterolisthesis of L4 on L5.  L1/L2: There is mild bilateral neural foraminal narrowing.  L2/L3: There is mild spinal canal narrowing and mild bilateral neural foraminal narrowing, greater on the right.  L3/4: There is mild bilateral neural foraminal narrowing. There is mild to moderate spinal canal narrowing.  L4/5: There is mild spinal canal narrowing and bilateral neural foraminal narrowing, mild to moderate on the right and mild on the left.  L5/S1: There is mild right neural foraminal narrowing with mild contacting along the undersurface of the exiting right L5 nerve root.  S1/S2: There is no spinal canal or neural foraminal narrowing. 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  --- End of Report ---   HORTENCIA MOONEY MD; Attending Radiologist This document has been electronically signed. Nov 7 2022  1:13PM     XR 2V Right Hip 10/28/2022 - Status post hemiarthroplasty with no periprosthetic fracture- reviewed with patient and friend.	  XR AP LAT Lumbar 10/28/2022 - No acute fracture. Grade 1 spondylolisthesis of L4 on L5. Moderate degenerative spondylosis at L2-L3. Old compression fracture L2. - reviewed with patient and friend.		  XR AP Lat Thoracic 10/28/2022 - No acute fracture. Old mild compression fractures T4-T8.- reviewed with patient and friend.

## 2023-09-07 ENCOUNTER — APPOINTMENT (OUTPATIENT)
Dept: ORTHOPEDIC SURGERY | Facility: CLINIC | Age: 83
End: 2023-09-07
Payer: MEDICARE

## 2023-09-07 VITALS
HEIGHT: 64 IN | SYSTOLIC BLOOD PRESSURE: 147 MMHG | HEART RATE: 77 BPM | BODY MASS INDEX: 19.63 KG/M2 | WEIGHT: 115 LBS | OXYGEN SATURATION: 99 % | DIASTOLIC BLOOD PRESSURE: 78 MMHG

## 2023-09-07 PROCEDURE — 99214 OFFICE O/P EST MOD 30 MIN: CPT

## 2023-09-07 NOTE — PHYSICAL EXAM
[Normal] : Gait: normal [Stooped] : stooped [Coats's Sign] : negative Coats's sign [Pronator Drift] : negative pronator drift [SLR] : negative straight leg raise [de-identified] : 5 out of 5 motor strength, sensation is intact and symmetrical full range of motion flexion extension and rotation, no palpatory tenderness full range of motion of hips knees shoulders and elbows (all four extremities), no atrophy, negative straight leg raise, no pathological reflexes, no swelling, normal ambulation, no apparent distress skin is intact, no palpable lymph nodes, no upper or lower extremity instability, alert and oriented x3 and normal mood. Normal finger-to nose test.  No percussion tenderness lower thoracic upper lumbar. Pain over left troch. [de-identified] : I reviewed, interpreted and clinically correlated the following outside imaging studies,  MRI Thoracic 8/31/23  ([facility])  IMPRESSION:    Acute/subacute T12 vertebral wedge compression deformity with minimal retropulsion. This finding is new compared to the prior studies.   Chronic inferior L2 endplate depression, unchanged.   Multifocal degenerative thoracic, lumbar disc change.  Minimal L2-3 spinal canal narrowing. Right L2-3 foraminal disc extension which abuts the right L2 nerve root. No significant thoracic canal narrowing.  Patent thoracic foramina.     CLINICAL INDICATION: Evaluate thoracic compression fracture.   TECHNIQUE: Multi-planar multi-sequential MR imaging of the thoracic spine was performed without the administration of intravenous contrast, according to standard protocol.   COMPARISON: CT chest, 8/22. MRI lumbar spine, 6/21.   FINDINGS:   ALIGNMENT: Alignment is preserved.   VERTEBRAE:    T12 vertebral compression deformity with up to 80-90% loss of vertebral height particularly centrally on the right. Slight retropulsion. Edema noted predominantly superiorly. Slight edema borders the anterior inferior T11 endplate.    Chronic inferior L2 endplate depression with up to approximately 50% loss of vertebral height. Vertebral height is otherwise preserved.    Slight discogenic change borders the thoracic discs. Small posterior T5 vertebral body hemangioma. The posterior elements are intact.   DISCS: Loss of T2 signal suspected at T11-12. Edema within the T12-L1 disc. Loss of T2 signal, L2-3 disc.   CORD: The conus medullaris terminates at L1-2. The thoracic cord demonstrates normal signal intensity and contour. No intradural abnormality.    EVALUATION OF INDIVIDUAL LEVELS:    Central C6-7 disc osteophyte complex almost abuts the anterior cord. Bilateral uncovertebral joint hypertrophy/ foraminal narrowing.   Central T1-2 disc protrusion with partial encroachment upon the subarachnoid space. Subtle central T10-11 disc protrusion. Central T11-12 disc bulge accompanies the slightly retropulsed T12 vertebral body. Central T12-L1, L1-2 disc bulges with minimal anterior thecal sac effacement.   Moderate central L2-3 disc bulge with consequent minimal canal narrowing. Right greater than left L2-3 foraminal disc extension which abuts the right L2 nerve root.   PARAVERTEBRAL SOFT TISSUES: No paraspinal abnormality. 1.9 cm posterior mid left renal cyst.    MRI Pelvis 8/17/23 Iliopsoas Bursitis.   MR SPINE THORACIC - ORDERED BY: MANDY WATKINS  EXAM: [id number] - MR SPINE LUMBAR - ORDERED BY: MANDY WATKINS   PROCEDURE DATE: 05/23/2023    INTERPRETATION: MR LUMBAR SPINE, MR THORACIC SPINE  CLINICAL INFORMATION: Thoracic and lumbar spine pain. Evaluate for compression fractures T12 compression fracture.  ADDITIONAL CLINICAL INFORMATION: Other Condition see Clinical Info  TECHNIQUE: Multiplanar, multisequence MRI was performed of the thoracic and lumbar spine. IV Contrast: NONE  COMPARISON: Thoracic and lumbar spine MRIs dated 11/7/2022. CT of the neck dated 1/25/2012.  FINDINGS:  LOCALIZER: There may be a healed fracture deformity of the odontoid base. Cervical degenerative disc disease is present with reversal of the normal lordosis and mild to moderate spinal canal stenosis suggested at C4-C5 and at least moderate spinal canal stenosis suggested at C5-C6. Bilateral hip arthroplasties are noted.  BONES: Moderate to severe T12 wedge compression deformity is increased from the prior with mild marrow edema suggesting more recent injury. Mild L2 inferior endplate compression deformity/broad Schmorl's node appears stable. There are slight Modic type I endplate degenerative changes noted at T11-T12, L2-L3, and L3-L4.  ALIGNMENT: Mild anterolisthesis is present at L4-L5.  BILATERAL SHOULDER JOINTS: Severe bilateral glenohumeral osteoarthritis is present and there is mild bilateral acromioclavicular joint arthrosis.  VISUALIZED SACROILIAC JOINTS: Maintained.  SPINAL CORD AND CAUDA EQUINA: Spinal cord is normal in signal. Conus terminates at L1-L2.  VISUALIZED THORACIC AND ABDOMINAL SOFT TISSUES: Presumed left renal cyst is again seen.  IMAGED LUNGS: Although limited on MRI of the visualized lungs demonstrate suspected scarring within the posterior right upper lobe versus other consolidation.  INDIVIDUAL LEVELS:  Thoracic T1-T2: Central disc protrusion is present without stenosis.  T11-T12: Mild T12 superior endplate retropulsion is present without significant stenosis.  T12-L1: Mild posterior disc bulging is present without stenosis.  Lumbar L1-L2: Disc bulging is present without significant stenosis.  L2-L3: Disc bulging is present lateralizing to the right with ligamentum flavum hypertrophy and moderate spinal canal stenosis. Moderate right and mild left neural foraminal stenosis is present.  L3-L4: Disc bulging is present with ligamentum flavum hypertrophy and mild to moderate spinal canal stenosis. Mild bilateral neural foraminal stenosis is present. There is mild left greater than right facet arthrosis.  L4-L5: Mild anterolisthesis is present with mild loss of disc height, mild disc bulging lateralizing to the right, mild ligament flavum hypertrophy, and mild-to-moderate spinal canal stenosis. Moderate right and mild left neural foraminal stenosis is present. There is mild-to-moderate left greater than right facet arthrosis.  L5-S1: Mild disc bulging is present laterally is a right without spinal canal stenosis. Mild to moderate right neural foraminal stenosis is present.  IMPRESSION: 1. Moderate to severe T12 wedge compression deformity is increased from the prior with mild edema suggesting more recent injury. 2. Mild T12 inferior endplate compression deformity/broad Schmorl's node appears stable. 3. Multilevel degenerative disc disease appears similar to the prior including cervical degenerative disc disease that is partially imaged on the localizer series. Consider dedicated MRI of the cervical spine as warranted. 4. There is no significant thoracic stenosis point 5. L2-L3 demonstrates moderate spinal canal stenosis that may be mildly increased from the prior. 6. L3-L4 and L4-L5 demonstrate mild to moderate spinal canal stenosis. 7. There is variable lumbar neural foraminal stenosis.  --- End of Report ---       XR AP Lat Lumbar 05/17/2023 - T12/L2 compression fracture, healing / Lumbar degenerative disc disease  -reviewed with patient.    XR AP Lat Lumbar 02/23/2023 -T12/L2 compression fracture, healing- reviewed with patient.  XR AP Lat Thoracic 02/23/2023 -T12 compression fracture, healing- reviewed with patient.  XR AP Lat Lumbar 01/09/2023 -T12 fracture -reviewed with patient.   XR AP Lat Lumbar 12/08/2022-healing T12 fracture, old L2-reviewed with patient.    I reviewed, interpreted and clinically correlated the following outside imaging studies,  EXAM: [id number] - MR SPINE LUMBAR  - ORDERED BY: MANDY WATKINS  EXAM: [id number] - MR SPINE THORACIC  - ORDERED BY: MANDY WATKINS   PROCEDURE DATE:  11/07/2022    INTERPRETATION:  CLINICAL INDICATION: Thoracic and lumbar spine pain. Concern for compression fracture. Low back muscle strain.  Multiplanar Multisequence MR of the thoracic and lumbar spine.  Prior Studies: None.  FINDINGS:  THORACIC SPINE:  ALIGNMENT: There is slight levoscoliosis of the thoracic spine. The thoracic kyphosis is maintained. There is no spondylolisthesis.  VERTEBRAL BODIES AND MARROW: There is an acute mild compression fracture of the T12 vertebral body with the fracture line subjacent to the superior endplate. There is mild to moderate loss of height. There is no osseous retropulsion.  DISC SPACES: There is multilevel mild disc height loss, most prominent at T5/T6 and T6/T7.  THORACIC CORD: Thoracic cord is normal in caliber and signal characteristics.  IMAGED THORACIC SOFT TISSUES: Intact.  The findings at the individual levels are as follows:  There is no spinal canal or neural foraminal narrowing. There is a minimal disc bulge with slight posterior osseous ridging at T11/T12.  LUMBAR SPINE:  ALIGNMENT: There is slight rightward curvature of the lower lumbar spine. There is trace left lateral listhesis of L2 on L3. Lumbar lordosis is maintained. There is mild degenerative grade 1 anterolisthesis of L4 on L5.  VERTEBRAL BODIES AND MARROW: There is a large chronic Schmorl's node along the inferior endplate of L2. Mild edematous end plate changes are seen at L2/L3 and L3/L4.  DISC SPACES: There is multilevel disc desiccation. There is mild disc height loss at L2/L3.  SACROILIAC JOINTS: There is mild bilateral sacroiliac joint arthrosis.  CONUS AND CAUDA EQUINA: Conus is normal in morphology terminating at the level of L1.  IMAGED ABDOMINAL AND PELVIC STRUCTURES: There is a left-sided renal cyst measuring up to 1.7 cm. There is colonic diverticulosis.  The findings at the individual levels are as follows:  L1/L2: There is minimal disc bulge with posterior osseous ridging. There is bilateral facet arthrosis. There is mild bilateral neural foraminal narrowing. There is no central canal narrowing.  L2/L3: There is mild diffuse disc bulge with a superimposed right foraminal disc protrusion. There is bilateral facet arthrosis and ligamentum flavum hypertrophy. Findings result in mild spinal canal narrowing and mild bilateral neural foraminal narrowing, greater on the right.  L3/4: There is mild diffuse disc bulge posterior osseous ridging. There is bilateral facet arthrosis and ligamentum flavum hypertrophy. There is mild bilateral neural foraminal narrowing. There is mild to moderate spinal canal narrowing.  L4/5: There is uncovering of the posterior disc with a mild diffuse disc bulge. There is bilateral facet arthrosis and ligamentum flavum hypertrophy. Findings result in mild spinal canal narrowing and bilateral neural foraminal narrowing, mild to moderate on the right and mild on the left.  L5/S1: There is bilateral facet arthrosis. There is a small right foraminal disc protrusion. There is mild right neural foraminal narrowing with mild contacting along the undersurface of the exiting right L5 nerve root. There is no central canal or left-sided neural foraminal narrowing.  IMPRESSION:  THORACIC SPINE:  Mild acute compression fracture of the T12 vertebral body along the superior endplate. No significant osseous retropulsion. No spinal canal or neural foraminal narrowing. Follow-up imaging is recommended to confirm reconstitution of fatty marrow signal and exclude any underlying pathologic etiology.  LUMBAR SPINE:  Multilevel lumbar spondylosis. Slight rightward curvature lower lumbar spine. Trace left lateral listhesis of L2 on L3. Mild degenerative grade 1 anterolisthesis of L4 on L5.  L1/L2: There is mild bilateral neural foraminal narrowing.  L2/L3: There is mild spinal canal narrowing and mild bilateral neural foraminal narrowing, greater on the right.  L3/4: There is mild bilateral neural foraminal narrowing. There is mild to moderate spinal canal narrowing.  L4/5: There is mild spinal canal narrowing and bilateral neural foraminal narrowing, mild to moderate on the right and mild on the left.  L5/S1: There is mild right neural foraminal narrowing with mild contacting along the undersurface of the exiting right L5 nerve root.  S1/S2: There is no spinal canal or neural foraminal narrowing. 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  --- End of Report ---   HORTENCIA MOONEY MD; Attending Radiologist This document has been electronically signed. Nov 7 2022  1:13PM     XR 2V Right Hip 10/28/2022 - Status post hemiarthroplasty with no periprosthetic fracture- reviewed with patient and friend.	  XR AP LAT Lumbar 10/28/2022 - No acute fracture. Grade 1 spondylolisthesis of L4 on L5. Moderate degenerative spondylosis at L2-L3. Old compression fracture L2. - reviewed with patient and friend.		  XR AP Lat Thoracic 10/28/2022 - No acute fracture. Old mild compression fractures T4-T8.- reviewed with patient and friend.

## 2023-09-07 NOTE — HISTORY OF PRESENT ILLNESS
[de-identified] : Ms. SHAUNA TOLEDO  is a 82 year old female who presents for evaluation of left sided thoracic pain and lumbar spine pain x several months.  Has T12 and L2 fractures.  She states that her pain has worsened.  Denies radiation of pain down the arms and legs. Has some numbness of the RLE.  She states certain movements and bending aggravates the pain.  Has tried NSAIDs and has relief. Left sided pelvic pain. Was referred to PT at last visit but states that the PT has worsened her pain. She participated with PT twice a week since the last visit. She stopped the PT about 3 weeks ago. She currently has pain at the right lower back. She states she had a fall earlier this year.  Presents today for MRI Thoracic review.  No fever chills sweats nausea vomiting no bowel or bladder dysfunction, no recent weight loss or gain no night pain. This history is in addition to the intake form that I personally reviewed.  [Stable] : stable

## 2023-09-07 NOTE — ADDENDUM
[FreeTextEntry1] : This note was written by Luis Velarde on 09/07/2023 acting as scribe for Dr. Ang Samson M.D.  I, Ang Samson MD, have read and attest that all the information, medical decision making and discharge instructions within are true and accurate.

## 2023-09-07 NOTE — DISCUSSION/SUMMARY
[de-identified] : T12/L2 compression fracture. Acute T12 compression fracture.  Lumbar degenerative disc disease Discussed all options.  Discussed kyphoplasty. Brace All options discussed including rest, medicine, home exercise, acupuncture, Chiropractic care, Physical Therapy, Pain management, and last resort surgery. All questions were answered, all alternatives discussed, and the patient is in complete agreement with the treatment plan which the patient contributed to and discussed with me through the shared decision-making process. Follow-up appointment as instructed. Any issues and the patient will call or come in sooner.  Her friend agrees with the plan.

## 2023-11-01 ENCOUNTER — APPOINTMENT (OUTPATIENT)
Dept: ORTHOPEDIC SURGERY | Facility: CLINIC | Age: 83
End: 2023-11-01
Payer: MEDICARE

## 2023-11-01 VITALS
DIASTOLIC BLOOD PRESSURE: 70 MMHG | HEART RATE: 59 BPM | HEIGHT: 64 IN | BODY MASS INDEX: 18.78 KG/M2 | OXYGEN SATURATION: 98 % | SYSTOLIC BLOOD PRESSURE: 129 MMHG | WEIGHT: 110 LBS

## 2023-11-01 PROCEDURE — 99214 OFFICE O/P EST MOD 30 MIN: CPT

## 2023-11-21 ENCOUNTER — APPOINTMENT (OUTPATIENT)
Dept: ORTHOPEDIC SURGERY | Facility: CLINIC | Age: 83
End: 2023-11-21
Payer: MEDICARE

## 2023-11-21 VITALS
HEIGHT: 64 IN | WEIGHT: 110 LBS | SYSTOLIC BLOOD PRESSURE: 145 MMHG | BODY MASS INDEX: 18.78 KG/M2 | HEART RATE: 64 BPM | DIASTOLIC BLOOD PRESSURE: 66 MMHG

## 2023-11-21 DIAGNOSIS — Z96.642 PRESENCE OF LEFT ARTIFICIAL HIP JOINT: ICD-10-CM

## 2023-11-21 DIAGNOSIS — M70.62 TROCHANTERIC BURSITIS, LEFT HIP: ICD-10-CM

## 2023-11-21 DIAGNOSIS — Z96.641 PRESENCE OF RIGHT ARTIFICIAL HIP JOINT: ICD-10-CM

## 2023-11-21 DIAGNOSIS — M54.50 LOW BACK PAIN, UNSPECIFIED: ICD-10-CM

## 2023-11-21 DIAGNOSIS — M51.36 OTHER INTERVERTEBRAL DISC DEGENERATION, LUMBAR REGION: ICD-10-CM

## 2023-11-21 PROCEDURE — 99214 OFFICE O/P EST MOD 30 MIN: CPT

## 2023-11-21 PROCEDURE — 73522 X-RAY EXAM HIPS BI 3-4 VIEWS: CPT

## 2023-11-27 ENCOUNTER — APPOINTMENT (OUTPATIENT)
Dept: ORTHOPEDIC SURGERY | Facility: CLINIC | Age: 83
End: 2023-11-27
Payer: MEDICARE

## 2023-11-27 VITALS — WEIGHT: 110 LBS | BODY MASS INDEX: 18.78 KG/M2 | HEIGHT: 64 IN

## 2023-11-27 DIAGNOSIS — S32.009A UNSPECIFIED FRACTURE OF UNSPECIFIED LUMBAR VERTEBRA, INITIAL ENCOUNTER FOR CLOSED FRACTURE: ICD-10-CM

## 2023-11-27 PROCEDURE — 99214 OFFICE O/P EST MOD 30 MIN: CPT

## 2023-11-27 PROCEDURE — 73565 X-RAY EXAM OF KNEES: CPT

## 2023-11-30 ENCOUNTER — APPOINTMENT (OUTPATIENT)
Dept: MRI IMAGING | Facility: CLINIC | Age: 83
End: 2023-11-30
Payer: MEDICARE

## 2023-11-30 ENCOUNTER — OUTPATIENT (OUTPATIENT)
Dept: OUTPATIENT SERVICES | Facility: HOSPITAL | Age: 83
LOS: 1 days | End: 2023-11-30
Payer: MEDICARE

## 2023-11-30 DIAGNOSIS — Z00.8 ENCOUNTER FOR OTHER GENERAL EXAMINATION: ICD-10-CM

## 2023-11-30 PROCEDURE — 73721 MRI JNT OF LWR EXTRE W/O DYE: CPT

## 2023-11-30 PROCEDURE — 73721 MRI JNT OF LWR EXTRE W/O DYE: CPT | Mod: 26,LT,MH

## 2023-12-07 ENCOUNTER — APPOINTMENT (OUTPATIENT)
Dept: ORTHOPEDIC SURGERY | Facility: CLINIC | Age: 83
End: 2023-12-07
Payer: MEDICARE

## 2023-12-07 VITALS
BODY MASS INDEX: 19.29 KG/M2 | HEART RATE: 85 BPM | OXYGEN SATURATION: 99 % | DIASTOLIC BLOOD PRESSURE: 77 MMHG | HEIGHT: 64 IN | WEIGHT: 113 LBS | SYSTOLIC BLOOD PRESSURE: 167 MMHG

## 2023-12-07 PROCEDURE — 99214 OFFICE O/P EST MOD 30 MIN: CPT

## 2024-01-19 ENCOUNTER — APPOINTMENT (OUTPATIENT)
Dept: ORTHOPEDIC SURGERY | Facility: CLINIC | Age: 84
End: 2024-01-19
Payer: MEDICARE

## 2024-01-19 DIAGNOSIS — S22.009A UNSPECIFIED FRACTURE OF UNSPECIFIED THORACIC VERTEBRA, INITIAL ENCOUNTER FOR CLOSED FRACTURE: ICD-10-CM

## 2024-01-19 PROCEDURE — 99448 NTRPROF PH1/NTRNET/EHR 21-30: CPT | Mod: 25

## 2024-01-19 NOTE — PHYSICAL EXAM
[Normal] : Gait: normal [Stooped] : stooped [Coats's Sign] : negative Coats's sign [Pronator Drift] : negative pronator drift [SLR] : negative straight leg raise [de-identified] : Adequate motor strength, sensation is intact and symmetrical full range of motion flexion extension and rotation, full range of motion of hips knees shoulders and elbows (all four extremities), no atrophy, negative straight leg raise, no swelling, normal ambulation, no apparent distress skin is intact, no upper or lower extremity instability, alert and oriented x3 and normal mood. Pain in knee. [de-identified] : MRI Thoracic 12/29/23  ([facility]) - mildly decreased bone marrow edema at known T12 compression fracture. No other interval change. Comparison made to MRI dated 8/31/2023.    MR KNEE LT  - ORDERED BY: MANDY WATKINS   PROCEDURE DATE:  11/30/2023    INTERPRETATION:  MRI OF THE LEFT KNEE  CLINICAL INDICATION: Knee pain TECHNIQUE: Multiplanar, Multisequence MRI was obtained of the left knee utilizing metal reduction techniques.  FINDINGS:  Left knee arthroplasty with associated susceptibility artifact. No evidence of osteolysis or loosening. No significant knee effusion. The medial and lateral collateral ligament complexes are intact. Quadriceps and patellar tendons are intact. Focus of increased marrow signal within the lateral aspect of the fibular head at the level the proximal tibiofibular joint, favor degenerative. No high-grade muscle atrophy. Course of the neurovascular structures are unremarkable.  IMPRESSION: Left knee arthroplasty. No evidence of osteolysis or loosening. No ligamentous injury.  --- End of Report ---     XR AP knee 11/27/2023 - left knee TKA, adequate - reviewed with the patient.   I reviewed, interpreted and clinically correlated the following outside imaging studies,  MRI Lumbar 11/16/23  ([facility]) -  L4-5 spondylolisthesis T12 compression fracture, edema Inferior L2 endplate depression with 50% loss of vertebral height.   Diffuse degenerative lower thoracic, lumbr disc change/disc protrusions. Mid/lower lumbar degenrative facet disease.   Minimal L2-3 - L4-5 spinal canal narrowing.  Multilevel foraminal disc extension/narowing with question of right L4, right L5 nerve rootcompression.   T12 vertebral compression deformity is unchanged comapred to 8/23.    MRI Thoracic 8/31/23  ([facility])  IMPRESSION:    Acute/subacute T12 vertebral wedge compression deformity with minimal retropulsion. This finding is new compared to the prior studies.   Chronic inferior L2 endplate depression, unchanged.   Multifocal degenerative thoracic, lumbar disc change.  Minimal L2-3 spinal canal narrowing. Right L2-3 foraminal disc extension which abuts the right L2 nerve root. No significant thoracic canal narrowing.  Patent thoracic foramina.     CLINICAL INDICATION: Evaluate thoracic compression fracture.   TECHNIQUE: Multi-planar multi-sequential MR imaging of the thoracic spine was performed without the administration of intravenous contrast, according to standard protocol.   COMPARISON: CT chest, 8/22. MRI lumbar spine, 6/21.   FINDINGS:   ALIGNMENT: Alignment is preserved.   VERTEBRAE:    T12 vertebral compression deformity with up to 80-90% loss of vertebral height particularly centrally on the right. Slight retropulsion. Edema noted predominantly superiorly. Slight edema borders the anterior inferior T11 endplate.    Chronic inferior L2 endplate depression with up to approximately 50% loss of vertebral height. Vertebral height is otherwise preserved.    Slight discogenic change borders the thoracic discs. Small posterior T5 vertebral body hemangioma. The posterior elements are intact.   DISCS: Loss of T2 signal suspected at T11-12. Edema within the T12-L1 disc. Loss of T2 signal, L2-3 disc.   CORD: The conus medullaris terminates at L1-2. The thoracic cord demonstrates normal signal intensity and contour. No intradural abnormality.    EVALUATION OF INDIVIDUAL LEVELS:    Central C6-7 disc osteophyte complex almost abuts the anterior cord. Bilateral uncovertebral joint hypertrophy/ foraminal narrowing.   Central T1-2 disc protrusion with partial encroachment upon the subarachnoid space. Subtle central T10-11 disc protrusion. Central T11-12 disc bulge accompanies the slightly retropulsed T12 vertebral body. Central T12-L1, L1-2 disc bulges with minimal anterior thecal sac effacement.   Moderate central L2-3 disc bulge with consequent minimal canal narrowing. Right greater than left L2-3 foraminal disc extension which abuts the right L2 nerve root.   PARAVERTEBRAL SOFT TISSUES: No paraspinal abnormality. 1.9 cm posterior mid left renal cyst.    MRI Pelvis 8/17/23 Iliopsoas Bursitis.   MR SPINE THORACIC - ORDERED BY: MANDY WATKINS  EXAM: [id number] - MR SPINE LUMBAR - ORDERED BY: MANDY WATKINS   PROCEDURE DATE: 05/23/2023    INTERPRETATION: MR LUMBAR SPINE, MR THORACIC SPINE  CLINICAL INFORMATION: Thoracic and lumbar spine pain. Evaluate for compression fractures T12 compression fracture.  ADDITIONAL CLINICAL INFORMATION: Other Condition see Clinical Info  TECHNIQUE: Multiplanar, multisequence MRI was performed of the thoracic and lumbar spine. IV Contrast: NONE  COMPARISON: Thoracic and lumbar spine MRIs dated 11/7/2022. CT of the neck dated 1/25/2012.  FINDINGS:  LOCALIZER: There may be a healed fracture deformity of the odontoid base. Cervical degenerative disc disease is present with reversal of the normal lordosis and mild to moderate spinal canal stenosis suggested at C4-C5 and at least moderate spinal canal stenosis suggested at C5-C6. Bilateral hip arthroplasties are noted.  BONES: Moderate to severe T12 wedge compression deformity is increased from the prior with mild marrow edema suggesting more recent injury. Mild L2 inferior endplate compression deformity/broad Schmorl's node appears stable. There are slight Modic type I endplate degenerative changes noted at T11-T12, L2-L3, and L3-L4.  ALIGNMENT: Mild anterolisthesis is present at L4-L5.  BILATERAL SHOULDER JOINTS: Severe bilateral glenohumeral osteoarthritis is present and there is mild bilateral acromioclavicular joint arthrosis.  VISUALIZED SACROILIAC JOINTS: Maintained.  SPINAL CORD AND CAUDA EQUINA: Spinal cord is normal in signal. Conus terminates at L1-L2.  VISUALIZED THORACIC AND ABDOMINAL SOFT TISSUES: Presumed left renal cyst is again seen.  IMAGED LUNGS: Although limited on MRI of the visualized lungs demonstrate suspected scarring within the posterior right upper lobe versus other consolidation.  INDIVIDUAL LEVELS:  Thoracic T1-T2: Central disc protrusion is present without stenosis.  T11-T12: Mild T12 superior endplate retropulsion is present without significant stenosis.  T12-L1: Mild posterior disc bulging is present without stenosis.  Lumbar L1-L2: Disc bulging is present without significant stenosis.  L2-L3: Disc bulging is present lateralizing to the right with ligamentum flavum hypertrophy and moderate spinal canal stenosis. Moderate right and mild left neural foraminal stenosis is present.  L3-L4: Disc bulging is present with ligamentum flavum hypertrophy and mild to moderate spinal canal stenosis. Mild bilateral neural foraminal stenosis is present. There is mild left greater than right facet arthrosis.  L4-L5: Mild anterolisthesis is present with mild loss of disc height, mild disc bulging lateralizing to the right, mild ligament flavum hypertrophy, and mild-to-moderate spinal canal stenosis. Moderate right and mild left neural foraminal stenosis is present. There is mild-to-moderate left greater than right facet arthrosis.  L5-S1: Mild disc bulging is present laterally is a right without spinal canal stenosis. Mild to moderate right neural foraminal stenosis is present.  IMPRESSION: 1. Moderate to severe T12 wedge compression deformity is increased from the prior with mild edema suggesting more recent injury. 2. Mild T12 inferior endplate compression deformity/broad Schmorl's node appears stable. 3. Multilevel degenerative disc disease appears similar to the prior including cervical degenerative disc disease that is partially imaged on the localizer series. Consider dedicated MRI of the cervical spine as warranted. 4. There is no significant thoracic stenosis point 5. L2-L3 demonstrates moderate spinal canal stenosis that may be mildly increased from the prior. 6. L3-L4 and L4-L5 demonstrate mild to moderate spinal canal stenosis. 7. There is variable lumbar neural foraminal stenosis.  --- End of Report ---       XR AP Lat Lumbar 05/17/2023 - T12/L2 compression fracture, healing / Lumbar degenerative disc disease  -reviewed with patient.    XR AP Lat Lumbar 02/23/2023 -T12/L2 compression fracture, healing- reviewed with patient.  XR AP Lat Thoracic 02/23/2023 -T12 compression fracture, healing- reviewed with patient.  XR AP Lat Lumbar 01/09/2023 -T12 fracture -reviewed with patient.   XR AP Lat Lumbar 12/08/2022-healing T12 fracture, old L2-reviewed with patient.    I reviewed, interpreted and clinically correlated the following outside imaging studies,  EXAM: [id number] - MR SPINE LUMBAR  - ORDERED BY: MANDY WATKINS  EXAM: [id number] - MR SPINE THORACIC  - ORDERED BY: MANDY WATKINS   PROCEDURE DATE:  11/07/2022    INTERPRETATION:  CLINICAL INDICATION: Thoracic and lumbar spine pain. Concern for compression fracture. Low back muscle strain.  Multiplanar Multisequence MR of the thoracic and lumbar spine.  Prior Studies: None.  FINDINGS:  THORACIC SPINE:  ALIGNMENT: There is slight levoscoliosis of the thoracic spine. The thoracic kyphosis is maintained. There is no spondylolisthesis.  VERTEBRAL BODIES AND MARROW: There is an acute mild compression fracture of the T12 vertebral body with the fracture line subjacent to the superior endplate. There is mild to moderate loss of height. There is no osseous retropulsion.  DISC SPACES: There is multilevel mild disc height loss, most prominent at T5/T6 and T6/T7.  THORACIC CORD: Thoracic cord is normal in caliber and signal characteristics.  IMAGED THORACIC SOFT TISSUES: Intact.  The findings at the individual levels are as follows:  There is no spinal canal or neural foraminal narrowing. There is a minimal disc bulge with slight posterior osseous ridging at T11/T12.  LUMBAR SPINE:  ALIGNMENT: There is slight rightward curvature of the lower lumbar spine. There is trace left lateral listhesis of L2 on L3. Lumbar lordosis is maintained. There is mild degenerative grade 1 anterolisthesis of L4 on L5.  VERTEBRAL BODIES AND MARROW: There is a large chronic Schmorl's node along the inferior endplate of L2. Mild edematous end plate changes are seen at L2/L3 and L3/L4.  DISC SPACES: There is multilevel disc desiccation. There is mild disc height loss at L2/L3.  SACROILIAC JOINTS: There is mild bilateral sacroiliac joint arthrosis.  CONUS AND CAUDA EQUINA: Conus is normal in morphology terminating at the level of L1.  IMAGED ABDOMINAL AND PELVIC STRUCTURES: There is a left-sided renal cyst measuring up to 1.7 cm. There is colonic diverticulosis.  The findings at the individual levels are as follows:  L1/L2: There is minimal disc bulge with posterior osseous ridging. There is bilateral facet arthrosis. There is mild bilateral neural foraminal narrowing. There is no central canal narrowing.  L2/L3: There is mild diffuse disc bulge with a superimposed right foraminal disc protrusion. There is bilateral facet arthrosis and ligamentum flavum hypertrophy. Findings result in mild spinal canal narrowing and mild bilateral neural foraminal narrowing, greater on the right.  L3/4: There is mild diffuse disc bulge posterior osseous ridging. There is bilateral facet arthrosis and ligamentum flavum hypertrophy. There is mild bilateral neural foraminal narrowing. There is mild to moderate spinal canal narrowing.  L4/5: There is uncovering of the posterior disc with a mild diffuse disc bulge. There is bilateral facet arthrosis and ligamentum flavum hypertrophy. Findings result in mild spinal canal narrowing and bilateral neural foraminal narrowing, mild to moderate on the right and mild on the left.  L5/S1: There is bilateral facet arthrosis. There is a small right foraminal disc protrusion. There is mild right neural foraminal narrowing with mild contacting along the undersurface of the exiting right L5 nerve root. There is no central canal or left-sided neural foraminal narrowing.  IMPRESSION:  THORACIC SPINE:  Mild acute compression fracture of the T12 vertebral body along the superior endplate. No significant osseous retropulsion. No spinal canal or neural foraminal narrowing. Follow-up imaging is recommended to confirm reconstitution of fatty marrow signal and exclude any underlying pathologic etiology.  LUMBAR SPINE:  Multilevel lumbar spondylosis. Slight rightward curvature lower lumbar spine. Trace left lateral listhesis of L2 on L3. Mild degenerative grade 1 anterolisthesis of L4 on L5.  L1/L2: There is mild bilateral neural foraminal narrowing.  L2/L3: There is mild spinal canal narrowing and mild bilateral neural foraminal narrowing, greater on the right.  L3/4: There is mild bilateral neural foraminal narrowing. There is mild to moderate spinal canal narrowing.  L4/5: There is mild spinal canal narrowing and bilateral neural foraminal narrowing, mild to moderate on the right and mild on the left.  L5/S1: There is mild right neural foraminal narrowing with mild contacting along the undersurface of the exiting right L5 nerve root.  S1/S2: There is no spinal canal or neural foraminal narrowing. 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  --- End of Report ---   HORTENCIA MOONEY MD; Attending Radiologist This document has been electronically signed. Nov 7 2022  1:13PM     XR 2V Right Hip 10/28/2022 - Status post hemiarthroplasty with no periprosthetic fracture- reviewed with patient and friend.	  XR AP LAT Lumbar 10/28/2022 - No acute fracture. Grade 1 spondylolisthesis of L4 on L5. Moderate degenerative spondylosis at L2-L3. Old compression fracture L2. - reviewed with patient and friend.		  XR AP Lat Thoracic 10/28/2022 - No acute fracture. Old mild compression fractures T4-T8.- reviewed with patient and friend.

## 2024-01-19 NOTE — HISTORY OF PRESENT ILLNESS
[Improving] : improving [de-identified] : Ms. SHAUNA TOLEDO  is a 83 year old female who presents for evaluation of left sided thoracic pain and lumbar spine pain x several months.  Has T12 and L2 fractures.  Denies radiation of pain down the arms and legs. Has some numbness of the RLE.  She states certain movements and bending aggravates the pain.  Has tried NSAIDs and has relief. Left sided pelvic pain. Was referred to PT at last visit but states that the PT has worsened her pain. She participated with PT twice a week since the last visit. She stopped the PT about 3 weeks ago. She currently has pain at the right lower back. She states she had a fall earlier this year.  She states that her back pain has slightly improved. Presents today for MRI Thoracic review.  No fever chills sweats nausea vomiting no bowel or bladder dysfunction, no recent weight loss or gain no night pain. This history is in addition to the intake form that I personally reviewed.

## 2024-01-19 NOTE — REASON FOR VISIT
[Home] : at home, [unfilled] , at the time of the visit. [Patient] : the patient [Follow-Up Visit] : a follow-up visit for [Other Location: e.g. Home (Enter Location, City,State)___] : at [unfilled]

## 2024-01-19 NOTE — DISCUSSION/SUMMARY
[de-identified] : Healing T12/L2 compression fracture Lumbar degenerative disc disease Feeling better. Left knee TKA, adequate.  L4-5 compression on right nerve root. Discussed all options. All options discussed including rest, medicine, home exercise, acupuncture, Chiropractic care, Physical Therapy, Pain management, and last resort surgery. All questions were answered, all alternatives discussed, and the patient is in complete agreement with the treatment plan which the patient contributed to and discussed with me through the shared decision-making process. Follow-up appointment as instructed. Any issues and the patient will call or come in sooner. Her friend agrees with the plan.

## 2024-11-07 ENCOUNTER — NON-APPOINTMENT (OUTPATIENT)
Age: 84
End: 2024-11-07

## 2024-11-07 ENCOUNTER — APPOINTMENT (OUTPATIENT)
Dept: NEUROLOGY | Facility: CLINIC | Age: 84
End: 2024-11-07
Payer: MEDICARE

## 2024-11-07 VITALS
DIASTOLIC BLOOD PRESSURE: 66 MMHG | HEART RATE: 91 BPM | BODY MASS INDEX: 18.78 KG/M2 | HEIGHT: 64 IN | SYSTOLIC BLOOD PRESSURE: 125 MMHG | WEIGHT: 110 LBS

## 2024-11-07 DIAGNOSIS — R41.3 OTHER AMNESIA: ICD-10-CM

## 2024-11-07 DIAGNOSIS — R20.0 ANESTHESIA OF SKIN: ICD-10-CM

## 2024-11-07 DIAGNOSIS — M54.50 LOW BACK PAIN, UNSPECIFIED: ICD-10-CM

## 2024-11-07 PROCEDURE — 99205 OFFICE O/P NEW HI 60 MIN: CPT

## 2024-11-11 ENCOUNTER — APPOINTMENT (OUTPATIENT)
Dept: MRI IMAGING | Facility: CLINIC | Age: 84
End: 2024-11-11

## 2024-11-11 ENCOUNTER — OUTPATIENT (OUTPATIENT)
Dept: OUTPATIENT SERVICES | Facility: HOSPITAL | Age: 84
LOS: 1 days | End: 2024-11-11
Payer: MEDICARE

## 2024-11-11 DIAGNOSIS — R20.0 ANESTHESIA OF SKIN: ICD-10-CM

## 2024-11-11 PROCEDURE — 70553 MRI BRAIN STEM W/O & W/DYE: CPT

## 2024-11-11 PROCEDURE — 70553 MRI BRAIN STEM W/O & W/DYE: CPT | Mod: 26,MH

## 2024-11-11 PROCEDURE — A9585: CPT

## 2024-12-02 ENCOUNTER — APPOINTMENT (OUTPATIENT)
Dept: NEUROLOGY | Facility: CLINIC | Age: 84
End: 2024-12-02
Payer: MEDICARE

## 2024-12-02 VITALS
WEIGHT: 110 LBS | HEART RATE: 86 BPM | BODY MASS INDEX: 18.78 KG/M2 | DIASTOLIC BLOOD PRESSURE: 70 MMHG | HEIGHT: 64 IN | SYSTOLIC BLOOD PRESSURE: 131 MMHG

## 2024-12-02 DIAGNOSIS — R20.0 ANESTHESIA OF SKIN: ICD-10-CM

## 2024-12-02 PROCEDURE — G2211 COMPLEX E/M VISIT ADD ON: CPT

## 2024-12-02 PROCEDURE — 99215 OFFICE O/P EST HI 40 MIN: CPT

## 2025-02-10 ENCOUNTER — APPOINTMENT (OUTPATIENT)
Dept: NEUROLOGY | Facility: CLINIC | Age: 85
End: 2025-02-10

## 2025-06-02 ENCOUNTER — APPOINTMENT (OUTPATIENT)
Dept: NEUROLOGY | Facility: CLINIC | Age: 85
End: 2025-06-02
Payer: MEDICARE

## 2025-06-02 ENCOUNTER — NON-APPOINTMENT (OUTPATIENT)
Age: 85
End: 2025-06-02

## 2025-06-02 VITALS
WEIGHT: 115 LBS | HEIGHT: 64 IN | DIASTOLIC BLOOD PRESSURE: 55 MMHG | HEART RATE: 69 BPM | SYSTOLIC BLOOD PRESSURE: 115 MMHG | BODY MASS INDEX: 19.63 KG/M2

## 2025-06-02 DIAGNOSIS — R20.0 ANESTHESIA OF SKIN: ICD-10-CM

## 2025-06-02 DIAGNOSIS — R41.3 OTHER AMNESIA: ICD-10-CM

## 2025-06-02 DIAGNOSIS — M54.50 LOW BACK PAIN, UNSPECIFIED: ICD-10-CM

## 2025-06-02 PROCEDURE — G2211 COMPLEX E/M VISIT ADD ON: CPT

## 2025-06-02 PROCEDURE — 99215 OFFICE O/P EST HI 40 MIN: CPT

## 2025-06-03 ENCOUNTER — APPOINTMENT (OUTPATIENT)
Dept: MRI IMAGING | Facility: CLINIC | Age: 85
End: 2025-06-03
Payer: MEDICARE

## 2025-06-03 ENCOUNTER — OUTPATIENT (OUTPATIENT)
Dept: OUTPATIENT SERVICES | Facility: HOSPITAL | Age: 85
LOS: 1 days | End: 2025-06-03
Payer: MEDICARE

## 2025-06-03 DIAGNOSIS — M51.369 OTHER INTERVERTEBRAL DISC DEGENERATION, LUMBAR REGION WITHOUT MENTION OF LUMBAR BACK PAIN OR LOWER EXTREMITY PAIN: ICD-10-CM

## 2025-06-03 PROCEDURE — 72148 MRI LUMBAR SPINE W/O DYE: CPT | Mod: 26

## 2025-06-03 PROCEDURE — 72148 MRI LUMBAR SPINE W/O DYE: CPT

## 2025-06-16 ENCOUNTER — APPOINTMENT (OUTPATIENT)
Dept: ORTHOPEDIC SURGERY | Facility: CLINIC | Age: 85
End: 2025-06-16
Payer: MEDICARE

## 2025-06-16 VITALS
OXYGEN SATURATION: 98 % | SYSTOLIC BLOOD PRESSURE: 160 MMHG | HEART RATE: 65 BPM | WEIGHT: 112 LBS | DIASTOLIC BLOOD PRESSURE: 70 MMHG | HEIGHT: 64 IN | BODY MASS INDEX: 19.12 KG/M2

## 2025-06-16 PROBLEM — M51.369 DISC DEGENERATION, LUMBAR: Status: ACTIVE | Noted: 2022-12-08

## 2025-06-16 PROCEDURE — 99214 OFFICE O/P EST MOD 30 MIN: CPT

## 2025-09-08 ENCOUNTER — APPOINTMENT (OUTPATIENT)
Dept: ORTHOPEDIC SURGERY | Facility: CLINIC | Age: 85
End: 2025-09-08
Payer: MEDICARE

## 2025-09-08 VITALS
HEIGHT: 64 IN | DIASTOLIC BLOOD PRESSURE: 81 MMHG | HEART RATE: 67 BPM | BODY MASS INDEX: 18.78 KG/M2 | SYSTOLIC BLOOD PRESSURE: 149 MMHG | WEIGHT: 110 LBS | OXYGEN SATURATION: 99 %

## 2025-09-08 DIAGNOSIS — S22.009A UNSPECIFIED FRACTURE OF UNSPECIFIED THORACIC VERTEBRA, INITIAL ENCOUNTER FOR CLOSED FRACTURE: ICD-10-CM

## 2025-09-08 DIAGNOSIS — S32.009A UNSPECIFIED FRACTURE OF UNSPECIFIED LUMBAR VERTEBRA, INITIAL ENCOUNTER FOR CLOSED FRACTURE: ICD-10-CM

## 2025-09-08 PROCEDURE — 99214 OFFICE O/P EST MOD 30 MIN: CPT
